# Patient Record
(demographics unavailable — no encounter records)

---

## 2025-01-10 NOTE — PHYSICAL EXAM
[Nasal Endoscopy Performed] : nasal endoscopy was performed, see procedure section for findings [Midline] : trachea located in midline position [Laryngoscopy Performed] : laryngoscopy was performed, see procedure section for findings [Normal] : no rashes [de-identified] : thick cerumen removed from the left EAC. TM intact, ME clear

## 2025-01-10 NOTE — ASSESSMENT
[FreeTextEntry1] : 49M here in followup. He was last seen 6 years ago. Since then, he reports persistent mostly similar sx. There is throat discomfort and occasional throat pain. It is worse in the morning and with acidic foods.  He has known GERD and has been on PPIs in past which reliably improve his sx in the past. Currently he is off meds and takes tums as needed which help. He has h/o 'nasal polyps' and underwent ESS in 2017. At that time his above sx were present and was told they were due to both reflux and sinusitis. After surgery, his throat issues did improve. He has known hearing loss for years and also wants to ensure his hearing has not worsened. There is no otorrhea, otalgia or vertigo. Audiogram from today shows normal hearing sloping to moderately severe/severe SNHL, as above, right side slightly worse. On exam, nasal endoscopy shows e/o prior sinus surgery with patent paranasal sinuses; there is mild polypoid edema, right worse than left, but no mucus or mucin. Fiberoptic laryngoscopy reveals obvious postcricoid edema. Cerumen was removed from the left ear. The rest of the head and neck exam is otherwise unremarkable.  -Laryngopharyngeal reflux causing persistent throat discomfort/pain - restart BID PPIs for 6 weeks and maintain diet/lifestyle. -Sinusitis - very mild polypoid edema - would start budesonide rinses given history and exam -Ear - hearing loss due to known HF SNHL - audio in 2yrs

## 2025-01-10 NOTE — CONSULT LETTER
[Dear  ___] : Dear  [unfilled], [Courtesy Letter:] : I had the pleasure of seeing your patient, [unfilled], in my office today. [Consult Closing:] : Thank you very much for allowing me to participate in the care of this patient.  If you have any questions, please do not hesitate to contact me. [Sincerely,] : Sincerely, [FreeTextEntry3] : Lavell Mcguire MD\par  Department of Otolaryngology - Head and Neck Surgery\par  [facility]

## 2025-01-10 NOTE — PROCEDURE
[FreeTextEntry3] : Nasal Endoscopy: e/o prior sinus surgery; all paranasal sinuses grossly patent, mild polypoid change right frontal recess and mild polypoid change left frontoethmoid region and sphenoethmoidal recess; no mucopus  Fiberoptic Laryngoscopy: upper airway widely patent TVF symmetric and mobile moderate postcricoid edema no masses

## 2025-01-10 NOTE — HISTORY OF PRESENT ILLNESS
[de-identified] : 49M here in followup.  He was last seen 6 years ago. Since then, he reports persistent mostly similar sx. There is throat discomfort and occasional pain. It is worse in the morning and with acidic foods.  He has known GERD and has been on PPIs in past which reliably improve his sx in the past. Currently he is off meds and takes tums as needed which help. He has h/o 'nasal polyps' and underwent ESS in 2017. At that time his above sx were present and was told they were due to both reflux and sinusitis. After surgery, his throat issues did improve. He has known hearing loss for years and also wants to ensure his hearing has not worsened. There is no otorrhea, otalgia or vertigo.  Audiogram from today: R: normal hearing sloping to severe SNHL, SRT 10, 100%, type A L: normal hearing sloping to moderately severe SNHL, SRT 10, 100%, type A  ROS otherwise unremarkable.

## 2025-07-18 NOTE — HISTORY OF PRESENT ILLNESS
[de-identified] : 49M here in followup.  Since last seen 6 months ago overall he has done well. He has been using budesonide rinses w some consistency which he feels help. He has tried to stay off PPIs and maintain healthy diet/lifestyle as well. However, over the past 2 weeks or so has had some mild throat discomfort and is here to ensure all is ok.  He has known GERD and has been on PPIs in past which reliably improve his sx in the past.  He has h/o 'nasal polyps' and underwent ESS in 2017. At that time his above sx were present and was told they were due to both reflux and sinusitis. After surgery, his throat issues did improve.  Audiogram from 1/2025: R: normal hearing sloping to severe SNHL, SRT 10, 100%, type A L: normal hearing sloping to moderately severe SNHL, SRT 10, 100%, type A  ROS otherwise unremarkable.

## 2025-07-18 NOTE — PROCEDURE
[FreeTextEntry3] : Nasal Endoscopy: e/o prior sinus surgery; middle meati widely patent, mild polypoid change right frontal recess and mild polypoid change left frontoethmoid region and sphenoethmoidal recess; no mucopus choana clear  Fiberoptic Laryngoscopy: upper airway widely patent TVF symmetric and mobile supraglottic erythema mild postcricoid edema no masses

## 2025-07-18 NOTE — CONSULT LETTER
[Dear  ___] : Dear  [unfilled], [Courtesy Letter:] : I had the pleasure of seeing your patient, [unfilled], in my office today. [Consult Closing:] : Thank you very much for allowing me to participate in the care of this patient.  If you have any questions, please do not hesitate to contact me. [Sincerely,] : Sincerely, [FreeTextEntry3] : Lavell Mcguire MD\par  Department of Otolaryngology - Head and Neck Surgery\par  [facility]

## 2025-07-18 NOTE — PHYSICAL EXAM
[de-identified] : soft, flat [de-identified] : EAC clear, TM intact, ME clear [Nasal Endoscopy Performed] : nasal endoscopy was performed, see procedure section for findings [Midline] : trachea located in midline position [Laryngoscopy Performed] : laryngoscopy was performed, see procedure section for findings [Normal] : no rashes

## 2025-07-18 NOTE — ASSESSMENT
[FreeTextEntry1] : 49M here in followup. Since last seen 6 months ago overall he has done well. He has been using budesonide rinses w some consistency which he feels help. He has tried to stay off PPIs and maintain healthy diet/lifestyle as well. However, over the past 2 weeks or so has had some mild throat discomfort and is here to ensure all is ok. He has known GERD and has been on PPIs in past which reliably improve his sx in the past. He has h/o 'nasal polyps' and underwent ESS in 2017. At that time his above sx were present and was told they were due to both reflux and sinusitis. After surgery, his throat issues did improve. On exam, nasal endoscopy shows e/o prior sinus surgery with patent paranasal sinuses; there is mild polypoid edema, right worse than left, but no mucus or mucin. Fiberoptic laryngoscopy reveals mild supraglottic erythema and mild postcricoid edema. The rest of the head and neck exam is otherwise unremarkable. Exam is more or less unchanged as above- -Laryngopharyngeal reflux causing throat discomfort/pain - can restart PPIs as neededfor 6 weeks and maintain diet/lifestyle and trigger avoidance -Sinusitis - very mild polypoid edema - cont budesonide rinses given history and exam --RTO 6 months

## 2025-07-23 NOTE — HEALTH RISK ASSESSMENT
[Good] : ~his/her~  mood as  good [Yes] : Yes [2 - 4 times a month (2 pts)] : 2-4 times a month (2 points) [1 or 2 (0 pts)] : 1 or 2 (0 points) [Never (0 pts)] : Never (0 points) [No] : In the past 12 months have you used drugs other than those required for medical reasons? No [No falls in past year] : Patient reported no falls in the past year [0] : 2) Feeling down, depressed, or hopeless: Not at all (0) [PHQ-2 Negative - No further assessment needed] : PHQ-2 Negative - No further assessment needed [Never] : Never [HIV test declined] : HIV test declined [Hepatitis C test declined] : Hepatitis C test declined [Change in mental status noted] : Change in mental status noted [With Family] : lives with family [Employed] : employed [] :  [# Of Children ___] : has [unfilled] children [Fully functional (bathing, dressing, toileting, transferring, walking, feeding)] : Fully functional (bathing, dressing, toileting, transferring, walking, feeding) [Fully functional (using the telephone, shopping, preparing meals, housekeeping, doing laundry, using] : Fully functional and needs no help or supervision to perform IADLs (using the telephone, shopping, preparing meals, housekeeping, doing laundry, using transportation, managing medications and managing finances) [Reports normal functional visual acuity (ie: able to read med bottle)] : Reports normal functional visual acuity [Seat Belt] :  uses seat belt [Sunscreen] : uses sunscreen [Patient/Caregiver not ready to engage] : , patient/caregiver not ready to engage [Monthly or less (1 pt)] : Monthly or less (1 point) [NO] : No [Patient reported colonoscopy was normal] : Patient reported colonoscopy was normal [Audit-CScore] : 1 [[id number]] : 0 [Reports changes in hearing] : Reports no changes in hearing [Reports changes in vision] : Reports no changes in vision [Reports changes in dental health] : Reports no changes in dental health [TB Exposure] : is not being exposed to tuberculosis [ColonoscopyDate] : 07/2025 [ColonoscopyComments] : Follow-up in 5 years [AdvancecareDate] : 07/2025

## 2025-07-23 NOTE — HISTORY OF PRESENT ILLNESS
[FreeTextEntry1] : 49-year-old male, currently on treatment with omeprazole 20 mg for LPR, returns for CPE. Since he was last seen 1 year ago, he did not his hospitalization, surgery, or new medical diagnoses [de-identified] : Was diagnosed with borderline diabetes (hemoglobin A1c 6.8) on prior visit.  Lifestyle management was encouraged. Also diagnosed with HLD (LDL = 145) but declined medication.  Is trying to manage based on lifestyle alterations. Closely followed by ENT for resistant GERD/LPR despite treatment with PPI as well as for SNHL at high frequencies. Also has long history of bilateral calf pain without weakness or associated symptoms.  Did not pursue workup previously recommended but now requests referral to specialist likely soft tissue in nature (?  Chronic injury) States that he snores heavily almost every night (according to his wife) and, on questioning, admits to occasional daytime sleepiness.  Specific apneic episodes apparently have not been observed. Now complains of pain in multiple finger joints including PIP of left third finger and right first finger and left thumb.  States this sometimes causes difficulty in opening jars.  Thumb symptoms have been present for years but finger symptoms are more recent.  Perhaps slightly progressive.  Denies swelling or other local or systemic symptoms.

## 2025-07-23 NOTE — ASSESSMENT
[FreeTextEntry1] : Health Maintenance His BMI is good and no weight loss is currently recommended. Daily aerobic exercises strongly recommended. No STD risk or substance abuse per patient report. Occasional gender specific self-examination is suggested. No depression. Competent with ADLs. Serial surveillance colonoscopy in 5 years. Completed primary COVID-vaccine series with 1 additional booster but did not get further boosters or updated variant specific vaccine. Flu vaccine recommended yearly in October/November. Discussed shingles vaccine including benefits and potential side effects.  Patient anxious to proceed. He will receive first dose when he returns in October for flu vaccine.  He is aware that he needs to come back for a second dose 3 to 4 months.  Nonspecific bilateral calf pain Exam negative for atrophy, edema, or tenderness..  Bounding DP pulses are present bilaterally Based on the above, patient advised that PAD is essentially ruled out.  Also, diabetes associated peripheral neuropathy is extremely unlikely in absence of neuropathic symptoms. Suspect pain is soft tissue in nature, possibly chronic injury.  Referred to pain medicine for further evaluation at patient request.  Contact information provided.  Nightly snoring No specific apneic episodes observed but occasional daytime sleepiness At patient request, referral and contact information were provided for consultation with sleep specialist. Further discussion once he has completed sleep study (if indicated).  Arthralgias Multiple finger arthralgias, likely arthritic.  Left thumb symptoms may be secondary to de Quervain's syndrome. Patient declines x-rays at this time. Recommend trial of NSAIDs at low-dose but for an extended period of time to assess diagnostic response.  Patient instructed to take 1 Advil 3 times a day on a full stomach for the next 10 to 14 days.  Arthritis diagnosis would be supported by good response to this trial after which he could continue to use NSAIDs on an as-needed basis. Will also send blood work to assess for inflammatory etiology including RA/ANN MARIE/inflammatory markers. If symptoms persist despite treatment and blood workup is negative, will refer to hand surgery. Patient indicates understanding and agreement.  HLD REVIEWED lipid panel from last year which shows significant elevation of LDL at 145, especially given HDL of 45. Explained the rationale (to reduce vascular and other complications) as well as goal (LDL under 85) for treatment of this condition. Patient advised that he will almost certainly need to start on statin medication pending results of follow-up blood work sent today.  He states he has no problem at this and was expecting it.  States that his wife (also [place] ) is already on atorvastatin.  Borderline diabetes REVIEWED hemoglobin A1c from last year (6.8) indicating borderline diabetes. Explained the rationale (to reduce vascular and other complications) as well as the goal (hemoglobin A1c under 6.5) for management of this condition. Given [place]  haplotype, patient understands that treatment for diabetes will be necessary, probably sooner than later depending on results of follow-up blood work sent today. Again, he says that that he has no problem with his and was expecting it.  Wife is already taking metformin.

## 2025-07-23 NOTE — REVIEW OF SYSTEMS
[Negative] : Heme/Lymph [Joint Pain] : joint pain [Joint Stiffness] : joint stiffness [Muscle Pain] : muscle pain [Suicidal] : not suicidal [Depression] : no depression